# Patient Record
Sex: MALE | Race: BLACK OR AFRICAN AMERICAN | NOT HISPANIC OR LATINO | Employment: FULL TIME | URBAN - METROPOLITAN AREA
[De-identification: names, ages, dates, MRNs, and addresses within clinical notes are randomized per-mention and may not be internally consistent; named-entity substitution may affect disease eponyms.]

---

## 2017-01-17 ENCOUNTER — TELEPHONE (OUTPATIENT)
Dept: GASTROENTEROLOGY | Facility: CLINIC | Age: 32
End: 2017-01-17

## 2017-01-17 RX ORDER — PREDNISONE 10 MG/1
30 TABLET ORAL DAILY
Qty: 60 TABLET | Refills: 0 | Status: SHIPPED | OUTPATIENT
Start: 2017-01-17

## 2017-01-17 NOTE — TELEPHONE ENCOUNTER
----- Message from Katrin Blunt sent at 1/17/2017 11:30 AM EST -----  Regarding: PT CALLED - REQUESTING REFILL ON PREDNISONE 5MG  Contact: [phone]  [facility] [address]

## 2017-01-17 NOTE — TELEPHONE ENCOUNTER
"Per Dr Dolan: \"Give him prednisone 10 mg tablets.  Give him 60 tablets no refills     Cancel any appointments he has with me\" (Routing comment)     Medication e-scribed. No upcoming appts to cancel.   "

## 2017-02-09 RX ORDER — PREDNISONE 10 MG/1
TABLET ORAL
Qty: 60 TABLET | Refills: 0 | OUTPATIENT
Start: 2017-02-09

## 2023-09-25 ENCOUNTER — HOSPITAL ENCOUNTER (EMERGENCY)
Facility: HOSPITAL | Age: 38
Discharge: HOME | End: 2023-09-26
Attending: STUDENT IN AN ORGANIZED HEALTH CARE EDUCATION/TRAINING PROGRAM
Payer: COMMERCIAL

## 2023-09-25 VITALS
HEIGHT: 67 IN | TEMPERATURE: 97.6 F | WEIGHT: 180 LBS | OXYGEN SATURATION: 97 % | DIASTOLIC BLOOD PRESSURE: 111 MMHG | SYSTOLIC BLOOD PRESSURE: 194 MMHG | RESPIRATION RATE: 18 BRPM | BODY MASS INDEX: 28.25 KG/M2 | HEART RATE: 78 BPM

## 2023-09-25 DIAGNOSIS — S81.811A LACERATION OF RIGHT LOWER EXTREMITY, INITIAL ENCOUNTER: Primary | ICD-10-CM

## 2023-09-25 PROCEDURE — 99282 EMERGENCY DEPT VISIT SF MDM: CPT

## 2023-09-26 ENCOUNTER — APPOINTMENT (OUTPATIENT)
Dept: GENERAL RADIOLOGY | Facility: HOSPITAL | Age: 38
End: 2023-09-26
Payer: COMMERCIAL

## 2023-09-26 PROCEDURE — 73590 X-RAY EXAM OF LOWER LEG: CPT

## 2023-09-26 RX ADMIN — Medication 2 ML: at 00:09

## 2023-09-26 NOTE — FSED PROVIDER NOTE
Subjective   History of Present Illness  37yo M h/o crohns p/w laceration R leg after a pyrex glass shattered when he put chicken on it. Pt reports the glass cut his leg. Last tdap 3y ago. Washed it with peroxide afterwards.    History provided by:  Patient    Review of Systems   Musculoskeletal:         Laceration R leg   All other systems reviewed and are negative.    Past Medical History:   Diagnosis Date    Crohn disease     GERD (gastroesophageal reflux disease)     Psoriasis        Allergies   Allergen Reactions    Morphine Other (See Comments)     Renal failure    Humira [Adalimumab] Rash     White bumps and then spreaded       Past Surgical History:   Procedure Laterality Date    COLONOSCOPY  02/18/2016    crohns flare    COLONOSCOPY  05/04/2010    ih, inflammed, congested, decreased vasc pattern    INGUINAL HERNIA REPAIR Bilateral 12/21/2016    Procedure: INGUINAL HERNIA REPAIR LAPAROSCOPIC LIBRA W/ MESH;  Surgeon: Deyvi Gray MD;  [place] [facility] [place] [facility] [place]:     UPPER GASTROINTESTINAL ENDOSCOPY  02/18/2016    gastritis    UPPER GASTROINTESTINAL ENDOSCOPY  05/04/2010    acute gastritis       Family History   Problem Relation Age of Onset    Crohn's disease Mother        Social History     Socioeconomic History    Marital status: Single   Tobacco Use    Smoking status: Every Day     Types: Cigars    Smokeless tobacco: Never    Tobacco comments:     1 weekly   Substance and Sexual Activity    Alcohol use: Yes     Alcohol/week: 4.0 standard drinks     Types: 4 Glasses of wine per week    Drug use: Yes     Frequency: 7.0 times per week     Types: Marijuana     Comment: last used sunday 12/18/16           Objective   Physical Exam  Vitals and nursing note reviewed.   Constitutional:       General: He is not in acute distress.     Appearance: Normal appearance. He is not ill-appearing.   HENT:      Head: Normocephalic and atraumatic.      Nose: Nose normal.      Mouth/Throat:      Mouth: Mucous  membranes are moist.   Eyes:      Extraocular Movements: Extraocular movements intact.      Pupils: Pupils are equal, round, and reactive to light.   Pulmonary:      Effort: Pulmonary effort is normal.   Musculoskeletal:      Cervical back: Normal range of motion. No rigidity or tenderness.      Comments: R distal tib with 1in laceration without subcutaneous tissue exposed, full plantar flexion and dorsiflexion passive and against resistance, sensation intact, 2+ DP pulse   Skin:     General: Skin is warm and dry.      Capillary Refill: Capillary refill takes less than 2 seconds.   Neurological:      General: No focal deficit present.      Mental Status: He is alert and oriented to person, place, and time.       Laceration Repair    Date/Time: 9/26/2023 12:47 AM  Performed by: Ailyn Bashir MD  Authorized by: Ailyn Bashir MD     Consent:     Consent obtained:  Verbal    Consent given by:  Patient    Risks, benefits, and alternatives were discussed: yes      Risks discussed:  Infection, pain, need for additional repair and poor cosmetic result    Alternatives discussed:  No treatment  Universal protocol:     Procedure explained and questions answered to patient or proxy's satisfaction: yes      Imaging studies available: yes      Required blood products, implants, devices, and special equipment available: yes      Site/side marked: yes      Immediately prior to procedure, a time out was called: yes      Patient identity confirmed:  Verbally with patient  Anesthesia:     Anesthesia method:  Topical application and local infiltration    Topical anesthetic:  LET    Local anesthetic:  Lidocaine 1% WITH epi  Laceration details:     Location:  Leg    Leg location:  R lower leg    Length (cm):  2.5    Depth (mm):  2  Pre-procedure details:     Preparation:  Imaging obtained to evaluate for foreign bodies and patient was prepped and draped in usual sterile fashion  Exploration:     Limited defect created (wound extended):  no      Imaging obtained: x-ray      Imaging outcome: foreign body not noted      Wound exploration: entire depth of wound visualized      Contaminated: no    Treatment:     Area cleansed with:  Saline    Amount of cleaning:  Standard    Irrigation solution:  Sterile saline    Irrigation volume:  500cc    Irrigation method:  Syringe    Debridement:  None  Skin repair:     Repair method:  Sutures    Suture size:  5-0    Suture material:  Nylon    Suture technique:  Simple interrupted    Number of sutures:  3  Approximation:     Approximation:  Close  Repair type:     Repair type:  Simple  Post-procedure details:     Dressing:  Non-adherent dressing    Procedure completion:  Tolerated well, no immediate complications           ED Course  ED Course as of 09/26/23 0049   Tue Sep 26, 2023   0030 No fb noted on my review of XR [SH]   0046 Laceration repaired with 3 simple interrupted 5-0 ethilon sutures, care instructions given []      ED Course User Index  [] Ailyn Bashir MD                                           Medical Decision Making  Problems Addressed:  Laceration of right lower extremity, initial encounter: complicated acute illness or injury    Amount and/or Complexity of Data Reviewed  Radiology: ordered.    Risk  Prescription drug management.        Final diagnoses:   Laceration of right lower extremity, initial encounter       ED Disposition  ED Disposition       ED Disposition   Discharge    Condition   Stable    Comment   --               Provider, No Known  [facility] [id number]               Medication List        Changed      inFLIXimab 100 MG injection  Commonly known as: REMICADE  Infuse 446.5 mg into a venous catheter 1 (One) Time for 1 dose. Every 8 weeks  What changed:   how much to take  additional instructions

## 2023-09-26 NOTE — DISCHARGE INSTRUCTIONS
Your leg was sutured. Please keep the area clean with soap and water. Watch for signs of infection such as spreading redness, pus drainage, fevers. Please return in 14 days for suture removal.

## 2024-02-19 ENCOUNTER — TELEMEDICINE (OUTPATIENT)
Dept: FAMILY MEDICINE CLINIC | Facility: TELEHEALTH | Age: 39
End: 2024-02-19
Payer: COMMERCIAL

## 2024-02-19 DIAGNOSIS — H10.33 ACUTE BACTERIAL CONJUNCTIVITIS OF BOTH EYES: Primary | ICD-10-CM

## 2024-02-19 RX ORDER — MOXIFLOXACIN 5 MG/ML
1 SOLUTION/ DROPS OPHTHALMIC 3 TIMES DAILY
Qty: 3 ML | Refills: 0 | Status: SHIPPED | OUTPATIENT
Start: 2024-02-19

## 2024-02-19 NOTE — PROGRESS NOTES
You have chosen to receive care through a telehealth visit.  Do you consent to use a video/audio connection for your medical care today? Yes     HPI  Clint Rey is a 38 y.o. male  presents with complaint of bilateral eye redness and irritation present for 4 days. He reports eye discharge ( yellow) and exudate in the mornings matting his eyes closed. He reports a long standing h/o conjunctivitis and states his cigarette smoking make it worse. He is planning to quit.      Review of Systems   Constitutional: Negative.    Eyes:  Positive for photophobia, discharge and redness. Negative for pain, itching and visual disturbance.   Respiratory: Negative.     Neurological: Negative.    Psychiatric/Behavioral: Negative.         Past Medical History:   Diagnosis Date    Crohn disease     GERD (gastroesophageal reflux disease)     Psoriasis        Family History   Problem Relation Age of Onset    Crohn's disease Mother        Social History     Socioeconomic History    Marital status: Single   Tobacco Use    Smoking status: Every Day     Types: Cigars    Smokeless tobacco: Never    Tobacco comments:     1 weekly   Vaping Use    Vaping Use: Never used   Substance and Sexual Activity    Alcohol use: Yes     Alcohol/week: 4.0 standard drinks of alcohol     Types: 4 Glasses of wine per week    Drug use: Yes     Frequency: 7.0 times per week     Types: Marijuana     Comment: last used sunday 12/18/16         There were no vitals taken for this visit.    PHYSICAL EXAM  Physical Exam   Constitutional: He is oriented to person, place, and time. He appears well-developed and well-nourished. He does not have a sickly appearance. He does not appear ill. No distress.   HENT:   Head: Normocephalic and atraumatic.   Eyes: Right eye exhibits discharge and exudate. Right eye exhibits no hordeolum and no edema. No foreign body present in the right eye. Left eye exhibits discharge and exudate. Left eye exhibits no hordeolum and no edema.  No foreign body present in the left eye. Right conjunctiva is injected. Right conjunctiva has a hemorrhage. Left conjunctiva is injected. Left conjunctiva has no hemorrhage.   Bilateral eye redness   Neurological: He is alert and oriented to person, place, and time.   Vitals reviewed.      Diagnoses and all orders for this visit:    1. Acute bacterial conjunctivitis of both eyes (Primary)  -     moxifloxacin (Vigamox) 0.5 % ophthalmic solution; Administer 0.05 mL to both eyes 3 (Three) Times a Day.  Dispense: 3 mL; Refill: 0    Encouraged smoking cessation.   Follow up with ophthalmology prn no improvement or worsening s/s.       FOLLOW-UP  As discussed during visit with [facility], if symptoms worsen or fail to improve, follow-up with PCP/Urgent Care/Emergency Department.    Patient verbalizes understanding of medications, instructions for treatment and follow-up.    Melissa Vega, APRN  02/19/2024  12:42 EST    The use of a video visit has been reviewed with the patient and verbal informed consent has been obtained. Myself and Clint Rey participated in this visit. The patient is located in [facility], and I am located in [place], [place]. luma-idt and [employer]  were utilized.